# Patient Record
(demographics unavailable — no encounter records)

---

## 2021-05-07 NOTE — EDM.PDOC
ED HPI GENERAL MEDICAL PROBLEM





- General


Chief Complaint: General


Stated Complaint: BACK PAIN


Time Seen by Provider: 05/07/21 08:10


Source of Information: Reports: Patient, Family (wife)


History Limitations: Reports: No Limitations





- History of Present Illness


INITIAL COMMENTS - FREE TEXT/NARRATIVE: 





81-year-old male presents to the emergency room with complaints of left posteri

or rib pain.  Patient points the area of the lower rib cage source of pain and 

tenderness.  He was using the skid steer 48 hours ago when he actually got out 

of it and walked back into the corner of the bucket hitting his left lower 

posterior rib cage.  Has had persistent pain discomfort in that area.  He denies

shortness of breath or difficulty breathing.  He does though complain if he 

takes a deep breath and increases his pain and discomfort over the area of 

trauma.  He has had previous rib fractures in [place]  Other than chronic constipation issues he is really not 

having any other discomfort.  Tried some Tylenol for the discomfort.  He is 

otherwise fairly healthy 81-year-old an active gentleman.  He does take a couple

high blood pressure medications, Metformin for his diabetes, omeprazole for 

reflux, and medication for high cholesterol as well as BPH.  Followed by Mya Fried at the [facility].


Onset Date: 05/05/21


Duration: Day(s):, Waxing/Waning


Location: Reports: Chest (Left lower posterior ribs)


Quality: Reports: Sharp


Severity: Moderate


Improves with: Reports: Rest


Worsens with: Reports: Breathing, Movement


Associated Symptoms: Reports: No Other Symptoms


Treatments PTA: Reports: Acetaminophen


  ** Left Posterior Chest


Pain Score (Numeric/FACES): 9





- Related Data


                                    Allergies











Allergy/AdvReac Type Severity Reaction Status Date / Time


 


codeine Allergy  Cannot Verified 05/07/21 08:01





   Remember  


 


prednisone Allergy  Edema Verified 05/07/21 08:01











Home Meds: 


                                    Home Meds





Doxazosin Mesylate 2 mg PO DAILY 04/01/18 [History]


Fluticasone Propion/Salmeterol [Advair 250-50 Diskus] 1 puff INH DAILY 04/01/18 

[History]


Omeprazole 20 mg PO DAILY PRN 04/01/18 [History]


metFORMIN HCl [Metformin HCl ER] 1,000 mg PO DAILY 04/01/18 [History]


Aspirin [Halfprin] 81 mg PO DAILY 05/07/21 [History]


Cholecalciferol (Vitamin D3) [Vitamin D3] 25 mcg PO DAILY 05/07/21 [History]


Ketorolac [Toradol] 10 mg PO TID PRN #15 tab 05/07/21 [Rx]


Rosuvastatin Calcium [Crestor] 40 mg PO DAILY 05/07/21 [History]


Triamterene/Hydrochlorothiazid [Triamterene-HCTZ 75-50 MG] 1 tab PO DAILY 

05/07/21 [History]


lisinopriL [Lisinopril] 2.5 mg PO DAILY 05/07/21 [History]


traMADol [Ultram] 50 mg PO Q6H PRN #30 tab 05/07/21 [Rx]











Past Medical History


HEENT History: Reports: Cataract, Hard of Hearing


Cardiovascular History: Reports: High Cholesterol, Hypertension


Respiratory History: Reports: COPD


Gastrointestinal History: Reports: PUD


Musculoskeletal History: Reports: Arthritis


Endocrine/Metabolic History: Reports: Diabetes, Type II, Obesity/BMI 30+


Oncologic (Cancer) History: Reports: Basal Cell Carcinoma


Dermatologic History: Reports: Eczema, Other (See Below)


Other Dermatologic History: basal cancer on nose.





- Past Surgical History


HEENT Surgical History: Reports: Cataract Surgery


Cardiovascular Surgical History: Reports: None


Respiratory Surgical History: Reports: None


Other Respiratory Surgeries/Procedures: uses nebulizer prn,.  on Advair diskus


GI Surgical History: Reports: Appendectomy, Colonoscopy, EGD, Hernia, Abdominal


Endocrine Surgical History: Reports: None


Musculoskeletal Surgical History: Reports: Knee Replacement, Shoulder Surgery


Other Musculoskeletal Surgeries/Procedures:: bilateral knee replacements and 

rotator cuff repain bilateral shoulders.





Social & Family History





- Family History


Family Medical History: No Pertinent Family History





- Tobacco Use


Tobacco Use Status *Q: Former Tobacco User


Used Tobacco, but Quit: Yes


Month/Year Tobacco Last Used: quit 32 yrs ago





- Caffeine Use


Caffeine Use: Reports: Soda, Tea


Caffeine Use Comment: one cup of tea a day





- Recreational Drug Use


Recreational Drug Use: No





ED ROS GENERAL





- Review of Systems


Review Of Systems: See Below


Constitutional: Reports: No Symptoms


HEENT: Reports: Glasses


Respiratory: Reports: No Symptoms


Cardiovascular: Reports: No Symptoms


Endocrine: Reports: High Glucose


GI/Abdominal: Reports: Constipation


: Reports: No Symptoms


Musculoskeletal: Reports: Other (Rib pain posterior)


Skin: Reports: No Symptoms


Neurological: Reports: No Symptoms


Psychiatric: Reports: No Symptoms


Hematologic/Lymphatic: Reports: No Symptoms


Immunologic: Reports: No Symptoms





ED EXAM, GENERAL





- Physical Exam


Exam: See Below


Exam Limited By: No Limitations


General Appearance: Alert, WD/WN, No Apparent Distress


Eye Exam: Bilateral Eye: EOMI, Other (eye glasses)


Ears: Hearing Grossly Normal


Throat/Mouth: Normal Voice, No Airway Compromise


Head: Atraumatic


Neck: Normal Inspection


Respiratory/Chest: No Respiratory Distress, Lungs Clear, Normal Breath Sounds, 

Chest Non-Tender (Left lower posterior ribs)


Cardiovascular: Regular Rate, Rhythm


GI/Abdominal: Soft


Back Exam: CVA Tenderness (L).  No: Vertebral Tenderness


Extremities: Normal Inspection


Neurological: Alert, Oriented, No Motor/Sensory Deficits


Psychiatric: Normal Affect, Normal Mood


Skin Exam: Warm, Dry, Intact, Normal Color, No Rash





Course





- Vital Signs


Last Recorded V/S: 





                                Last Vital Signs











Temp  97.0 F   05/07/21 07:51


 


Pulse  57 L  05/07/21 08:00


 


Resp  18   05/07/21 08:00


 


BP  117/65   05/07/21 08:00


 


Pulse Ox  96   05/07/21 08:00














- Orders/Labs/Meds


Meds: 





Medications














Discontinued Medications














Generic Name Dose Route Start Last Admin





  Trade Name Freq  PRN Reason Stop Dose Admin


 


Ketorolac Tromethamine  30 mg  05/07/21 08:49 





  Ketorolac 30 Mg/Ml Sdv  IM  05/07/21 08:50 





  ONETIME ONE  














- Radiology Interpretation


Free Text/Narrative:: 





Chest x-ray, left ribs





Findings:





There appeared to be a subtle fractures of the lateral left eighth and 10th 

ribs.


There is an old fracture deformity of the posterior left fifth rib.


There are surgical changes of the left shoulder.  The lungs are clear.





The cardiac silhouette is moderately enlarged.


There is no pneumothorax.





Impression:








Old and more recent left-sided rib fractures





Departure





- Departure


Time of Disposition: 09:15


Disposition: Home, Self-Care 01


Condition: Good


Clinical Impression: 


Closed rib fracture


Qualifiers:


 Encounter type: initial encounter Rib fracture type: multiple ribs Laterality: 

left Qualified Code(s): S22.42XA - Multiple fractures of ribs, left side, 

initial encounter for closed fracture








- Discharge Information


Prescriptions: 


Ketorolac [Toradol] 10 mg PO TID PRN #15 tab


 PRN Reason: Pain


traMADol [Ultram] 50 mg PO Q6H PRN #30 tab


 PRN Reason: Pain


Instructions:  Rib Fracture, Rib Contusion


Referrals: 


Mya Fried NP [Primary Care Provider] - 


Forms:  ED Department Discharge


Additional Instructions: 


1.  Rest.


2.  Toradol 10 mg TID prn #15 dispense


3.  Ultram 50 mg every 6 hours as needed for pain #30 dispensed


4.  Spirometry 10 times an hour while awake.


5.  Follow-up with your primary care in 1 week if symptoms are not improving.  

Return to the emergency room if any difficulty breathing or increased shortness 

of breath occurs.





Sepsis Event Note (ED)





- Evaluation


Sepsis Screening Result: No Definite Risk





- Focused Exam


Vital Signs: 





                                   Vital Signs











  Temp Pulse Resp BP Pulse Ox


 


 05/07/21 08:00   57 L  18  117/65  96


 


 05/07/21 07:51  97.0 F  57 L  18  121/68  96














- Assessment/Plan


Assessment:: 





Left eighth and 10th subtle rib fractures


Old left fifth rib fracture


Plan: 





1.  Rest.


2.  Toradol 10 mg TID prn #15 dispense


3.  Ultram 50 mg every 6 hours as needed for pain #30 dispensed


4.  Spirometry 10 times an hour while awake.


5.  Follow-up with your primary care in 1 week if symptoms are not improving.  

Return to the emergency room if any difficulty breathing or increased shortness 

of breath occurs. oral

## 2021-05-07 NOTE — CR
______________________________________________________________________________   

  

[id number] RAD/RAD Ribs Left W PA Chest  

Exam:   

   

 RAD Ribs Left W PA Chest  

   

 Clinical Data:   

   

 TRAUMA  

   

 COMPARISON:   

   

 NO PREVIOUS SIMILAR EXAM IS AVAILABLE  

   

 FINDINGS:   

   

 There appear to be a subtle fractures of the lateral left eighth and 10th ribs  

   

 There is an old fracture deformity of the posterior left fifth rib  

   

 There are surgical changes of the left shoulder  

   

 The lungs are clear  

   

 The cardiac silhouette is moderately enlarged  

   

 There is no pneumothorax   

   

 IMPRESSION:  

   

 OLD AND MORE RECENT LEFT-SIDED RIB FRACTURES  

   

 Electronically signed by Dano Luu MD on 5/7/2021 8:29 AM  

   

  

Dano Luu MD                 

 05/07/21 0831    

  

Thank you for allowing us to participate in the care of your patient.